# Patient Record
Sex: FEMALE | Race: WHITE
[De-identification: names, ages, dates, MRNs, and addresses within clinical notes are randomized per-mention and may not be internally consistent; named-entity substitution may affect disease eponyms.]

---

## 2019-07-24 ENCOUNTER — HOSPITAL ENCOUNTER (OUTPATIENT)
Dept: HOSPITAL 62 - OD | Age: 44
End: 2019-07-24
Attending: ALLERGY & IMMUNOLOGY
Payer: COMMERCIAL

## 2019-07-24 DIAGNOSIS — J31.0: Primary | ICD-10-CM

## 2019-07-24 PROCEDURE — 70220 X-RAY EXAM OF SINUSES: CPT

## 2019-07-24 NOTE — RADIOLOGY REPORT (SQ)
EXAM DESCRIPTION:  PARANASAL SINUSES



COMPLETED DATE/TIME:  7/24/2019 11:17 am



REASON FOR STUDY:  CHRONIC RHINITIS J31.0  CHRONIC RHINITIS



COMPARISON:  None.



NUMBER OF VIEWS:  Three views.



TECHNIQUE:  Images of the paranasal sinuses acquired.



LIMITATIONS:  None.



FINDINGS:  ORBITS: No fracture. No foreign body.

SINUSES: No mucosal thickening. No air fluid levels.

FACIAL BONES: No fracture.

OTHER: No other significant finding.



IMPRESSION:  No sinus air-fluid levels.

No acute bony abnormality.



TECHNICAL DOCUMENTATION:  JOB ID:  [id number]

 2011 [employer]- All Rights Reserved



Reading location - IP/workstation name: [email or web address]